# Patient Record
Sex: MALE | Employment: OTHER | URBAN - METROPOLITAN AREA
[De-identification: names, ages, dates, MRNs, and addresses within clinical notes are randomized per-mention and may not be internally consistent; named-entity substitution may affect disease eponyms.]

---

## 2023-01-01 ENCOUNTER — TELEPHONE (OUTPATIENT)
Dept: UROLOGY | Facility: CLINIC | Age: 63
End: 2023-01-01
Payer: MEDICARE

## 2023-01-01 ENCOUNTER — OFFICE VISIT (OUTPATIENT)
Dept: UROLOGY | Facility: CLINIC | Age: 63
End: 2023-01-01
Payer: MEDICARE

## 2023-01-01 VITALS
BODY MASS INDEX: 21.48 KG/M2 | DIASTOLIC BLOOD PRESSURE: 64 MMHG | WEIGHT: 145 LBS | HEIGHT: 69 IN | SYSTOLIC BLOOD PRESSURE: 84 MMHG | HEART RATE: 125 BPM

## 2023-01-01 DIAGNOSIS — R33.9 URINARY RETENTION: Primary | ICD-10-CM

## 2023-01-01 DIAGNOSIS — Z97.8 FOLEY CATHETER IN PLACE PRIOR TO ARRIVAL: ICD-10-CM

## 2023-01-01 PROCEDURE — 1159F MED LIST DOCD IN RCRD: CPT | Mod: CPTII,S$GLB,, | Performed by: NURSE PRACTITIONER

## 2023-01-01 PROCEDURE — 3074F PR MOST RECENT SYSTOLIC BLOOD PRESSURE < 130 MM HG: ICD-10-PCS | Mod: CPTII,S$GLB,, | Performed by: NURSE PRACTITIONER

## 2023-01-01 PROCEDURE — 99999 PR PBB SHADOW E&M-EST. PATIENT-LVL IV: CPT | Mod: PBBFAC,,, | Performed by: NURSE PRACTITIONER

## 2023-01-01 PROCEDURE — 1160F RVW MEDS BY RX/DR IN RCRD: CPT | Mod: CPTII,S$GLB,, | Performed by: NURSE PRACTITIONER

## 2023-01-01 PROCEDURE — 99204 OFFICE O/P NEW MOD 45 MIN: CPT | Mod: S$GLB,,, | Performed by: NURSE PRACTITIONER

## 2023-01-01 PROCEDURE — 1159F PR MEDICATION LIST DOCUMENTED IN MEDICAL RECORD: ICD-10-PCS | Mod: CPTII,S$GLB,, | Performed by: NURSE PRACTITIONER

## 2023-01-01 PROCEDURE — 99999 PR PBB SHADOW E&M-EST. PATIENT-LVL IV: ICD-10-PCS | Mod: PBBFAC,,, | Performed by: NURSE PRACTITIONER

## 2023-01-01 PROCEDURE — 3078F PR MOST RECENT DIASTOLIC BLOOD PRESSURE < 80 MM HG: ICD-10-PCS | Mod: CPTII,S$GLB,, | Performed by: NURSE PRACTITIONER

## 2023-01-01 PROCEDURE — 3078F DIAST BP <80 MM HG: CPT | Mod: CPTII,S$GLB,, | Performed by: NURSE PRACTITIONER

## 2023-01-01 PROCEDURE — 3008F PR BODY MASS INDEX (BMI) DOCUMENTED: ICD-10-PCS | Mod: CPTII,S$GLB,, | Performed by: NURSE PRACTITIONER

## 2023-01-01 PROCEDURE — 3074F SYST BP LT 130 MM HG: CPT | Mod: CPTII,S$GLB,, | Performed by: NURSE PRACTITIONER

## 2023-01-01 PROCEDURE — 1160F PR REVIEW ALL MEDS BY PRESCRIBER/CLIN PHARMACIST DOCUMENTED: ICD-10-PCS | Mod: CPTII,S$GLB,, | Performed by: NURSE PRACTITIONER

## 2023-01-01 PROCEDURE — 99204 PR OFFICE/OUTPT VISIT, NEW, LEVL IV, 45-59 MIN: ICD-10-PCS | Mod: S$GLB,,, | Performed by: NURSE PRACTITIONER

## 2023-01-01 PROCEDURE — 3008F BODY MASS INDEX DOCD: CPT | Mod: CPTII,S$GLB,, | Performed by: NURSE PRACTITIONER

## 2023-01-01 RX ORDER — LANCETS 30 GAUGE
EACH MISCELLANEOUS
COMMUNITY
Start: 2023-01-01

## 2023-01-01 RX ORDER — DEXAMETHASONE 2 MG/1
2 TABLET ORAL 2 TIMES DAILY
COMMUNITY
Start: 2023-01-01

## 2023-01-01 RX ORDER — GABAPENTIN 300 MG/1
300 CAPSULE ORAL 3 TIMES DAILY
COMMUNITY
Start: 2023-01-01

## 2023-01-01 RX ORDER — BLOOD-GLUCOSE METER
EACH MISCELLANEOUS 4 TIMES DAILY
COMMUNITY
Start: 2023-01-01

## 2023-01-01 RX ORDER — PHENAZOPYRIDINE HYDROCHLORIDE 200 MG/1
200 TABLET, FILM COATED ORAL 3 TIMES DAILY
COMMUNITY
Start: 2023-01-01 | End: 2023-01-01

## 2023-01-01 RX ORDER — LEVETIRACETAM 500 MG/1
500 TABLET ORAL EVERY 12 HOURS
COMMUNITY
Start: 2023-01-01

## 2023-01-01 RX ORDER — TRIAMCINOLONE ACETONIDE 1 MG/G
CREAM TOPICAL 2 TIMES DAILY
COMMUNITY
Start: 2023-01-01

## 2023-01-01 RX ORDER — TAMSULOSIN HYDROCHLORIDE 0.4 MG/1
1 CAPSULE ORAL
COMMUNITY
Start: 2023-01-01

## 2023-01-01 RX ORDER — MONTELUKAST SODIUM 10 MG/1
10 TABLET ORAL
COMMUNITY
Start: 2023-01-01

## 2023-01-01 RX ORDER — LANCETS 33 GAUGE
EACH MISCELLANEOUS
COMMUNITY
Start: 2023-01-01

## 2023-01-01 RX ORDER — DOCUSATE SODIUM 100 MG/1
100 CAPSULE, LIQUID FILLED ORAL DAILY PRN
COMMUNITY
Start: 2023-01-01

## 2023-01-01 RX ORDER — IPRATROPIUM BROMIDE AND ALBUTEROL SULFATE 2.5; .5 MG/3ML; MG/3ML
SOLUTION RESPIRATORY (INHALATION) EVERY 4 HOURS
COMMUNITY
Start: 2023-01-01

## 2023-01-01 RX ORDER — LEVOCETIRIZINE DIHYDROCHLORIDE 5 MG/1
5 TABLET, FILM COATED ORAL
COMMUNITY
Start: 2023-01-01

## 2023-01-01 RX ORDER — BUPROPION HYDROCHLORIDE 150 MG/1
150 TABLET, FILM COATED, EXTENDED RELEASE ORAL 2 TIMES DAILY
COMMUNITY
Start: 2023-01-01

## 2023-01-01 RX ORDER — LEVALBUTEROL TARTRATE 45 UG/1
2 AEROSOL, METERED ORAL EVERY 6 HOURS PRN
COMMUNITY
Start: 2023-01-01

## 2023-01-01 RX ORDER — PRAVASTATIN SODIUM 40 MG/1
TABLET ORAL
COMMUNITY
Start: 2023-01-01

## 2023-01-01 RX ORDER — OMEPRAZOLE 20 MG/1
20 CAPSULE, DELAYED RELEASE ORAL
COMMUNITY
Start: 2023-01-01

## 2023-01-01 RX ORDER — FLUTICASONE PROPIONATE 50 MCG
2 SPRAY, SUSPENSION (ML) NASAL
COMMUNITY
Start: 2023-01-01

## 2023-01-01 RX ORDER — CYCLOBENZAPRINE HCL 10 MG
10 TABLET ORAL
COMMUNITY
Start: 2023-01-01

## 2023-01-01 RX ORDER — ALBUTEROL SULFATE 90 UG/1
AEROSOL, METERED RESPIRATORY (INHALATION)
COMMUNITY
Start: 2023-01-01

## 2023-01-01 RX ORDER — OXYCODONE AND ACETAMINOPHEN 10; 325 MG/1; MG/1
1 TABLET ORAL DAILY PRN
COMMUNITY
Start: 2023-01-01

## 2023-01-01 RX ORDER — METOPROLOL TARTRATE 25 MG/1
25 TABLET, FILM COATED ORAL 2 TIMES DAILY
COMMUNITY
Start: 2023-01-01

## 2023-01-01 RX ORDER — HYOSCYAMINE SULFATE 0.12 MG/1
TABLET SUBLINGUAL
COMMUNITY
Start: 2023-01-01

## 2023-01-01 RX ORDER — ASPIRIN 81 MG/1
81 TABLET ORAL
COMMUNITY
Start: 2023-01-01

## 2023-01-01 RX ORDER — TIOTROPIUM BROMIDE INHALATION SPRAY 3.12 UG/1
SPRAY, METERED RESPIRATORY (INHALATION)
COMMUNITY
Start: 2023-01-01

## 2023-07-31 NOTE — TELEPHONE ENCOUNTER
----- Message from Rhiannon Mobley sent at 7/31/2023  3:41 PM CDT -----  Type: Needs Medical Advice  Who Called:  pt  Best Call Back Number: [phone]  Additional Information:  pt is calling the office to see if he can take a shower tonight with the catheter in. Please call back to advise. Thanks!

## 2023-08-01 NOTE — TELEPHONE ENCOUNTER
----- Message from Liliya [place] sent at 8/1/2023  8:09 AM CDT -----  Contact: Ernestina  Type:  Needs Medical Advice    Who Called: Partner Ernestina  \Would the patient rather a call back or a response via [facility]? call  Best Call Back Number: [phone] (home)     Additional Information: pt wants to know can he come at 2:30pm instead of 8:30am to get his catheter removed. Pt has no transportation as of right now. Please advise and thank you.

## 2023-08-08 NOTE — TELEPHONE ENCOUNTER
----- Message from Janis Navarro sent at 8/8/2023  3:17 PM CDT -----  Contact: self  Type: Needs Medical Advice  Who Called: patient   Best Call Back Number: [phone]  Additional Information: Plz call pt back when possible about a removal wants to come tomorrow between 1-2 pm if possible.Thanks

## 2023-08-08 NOTE — TELEPHONE ENCOUNTER
----- Message from Ignacia Leyva sent at 8/8/2023  4:00 PM CDT -----  Contact: Ernestina [phone]  Type:  Patient Returning Call    Who Called:  Pts significate other Ernestina   Who Left Message for Patient:  Kaylah   Does the patient know what this is regarding?:  Appt to remove cath   Best Call Back Number:  [phone]    Additional Information:  Pls call back and advise / Pt also has a question about mixing pain meds and muscle relaxer.

## 2023-08-11 NOTE — PROGRESS NOTES
[facility] Note  Staff: GRANT Saucedo    PCP: N/A    Chief Complaint: Hospital f/up-Urinary Retention    Subjective:        HPI: James Rosado is a 63 y.o. male NEW PT presents today to have shaffer catheter removed at this time.  He is accompanied by family member to ov today.    Pt states today that he recently injured his right pelvic side/hip area one month ago and was admitted to [facility]. (No records to review at this time)  During hospital stay, pt was unable to empty his bladder and  was discharged at that time with shaffer catheter due to inability to empty his bladder.  Therefore he is here today requesting it to be removed.    Today:  Upon arrival pt catheter intact and patent draining without difficulty at this time.    Current  med: Flomax 0.4 mg one capsule daily.  Pt denies any previous problems with urination prior to hospital visit.    REVIEW OF SYSTEMS:  A comprehensive 10 system review was performed and is negative except as noted above in HPI    PMHx:  Past Medical History:   Diagnosis Date    COPD (chronic obstructive pulmonary disease)     Emphysema, unspecified     Hypertension     Lung cancer      PSHx:  Past Surgical History:   Procedure Laterality Date    PELVIC FRACTURE SURGERY Right      Allergies:  Hydrocodone bitartrate    Medications: reviewed   Objective:     Vitals:    08/11/23 0841   BP: (!) 84/64   Pulse: (!) 125     General:WDWN in NAD  Eyes: PERRLA, normal conjunctiva  Respiratory: no increased work on breathing, clear to auscultation  Cardiovascular: regular rate and rhythm. No obvious extremity edema.  GI: palpation of masses. No tenderness. No hepatosplenomegaly to palpation.  Musculoskeletal: normal range of motion of bilateral upper extremities. Normal muscle strength and tone.  Skin: no obvious rashes or lesions. No tightening of skin noted.  Neurologic: CN grossly normal. Normal sensation.   Psychiatric: awake, alert  and oriented x 3. Mood and affect normal. Cooperative.    Catheter Removal Procedure Note:  10 cc of sterile water from catheter balloon was removed via syringe by me today.  Indwelling Catheter then removed by me with tip intact.    Pt tolerated procedure well with no problems.    Assessment:       1. Urinary retention    2. Chua catheter in place prior to arrival          Plan:   Urinary Retention:    Pt was encouraged to drink plenty of fluids throughout the day today.  As well as bladder training every 2 hrs today in order to begin urinating on his own.  Should he have problems with urination by this afternoon or evening, he needs to RTC or go to nearest ER for further evaluation.    Pt and wife states today they live far away and prefer to f/up at their local ER later today should he have a problem emptying his bladder.  They also stated they will f/up with local Urologist in their hometown in the future for further evaluation at this time.    [facility]: N/A    Petrona Eaton, LAINE-C

## 2023-08-29 NOTE — TELEPHONE ENCOUNTER
----- Message from Chaz Mills sent at 8/29/2023  1:38 PM CDT -----  Contact: Roxanne  Type:  Sooner Appointment Request    Caller is requesting a sooner appointment.  Caller declined first available appointment listed below.  Caller will not accept being placed on the waitlist and is requesting a message be sent to doctor.    Name of Caller:  Ernestina (BRICE)  When is the first available appointment?  N/a  Symptoms:  Catheter Removal  Best Call Back Number: [phone]  Additional Information:  SO called to schedule appt for Catheter removal pt needs 3 day notice for transportation  Thank you

## 2023-08-29 NOTE — TELEPHONE ENCOUNTER
SO called to schedule appt for Catheter removal pt needs 3 day notice for transportation    SO c/o not having transportation to [place] early in the morning for appt to get catheter removed. Explained pt must arrive at 830 to have catheter removed and rtc  in the p.m. for bladder scan to make sure pt is emptying bladder. Pt wants to come at 2p.m., explained to pt he would have to be in clinic for 830 a.m. pt refused, advised pt to go to nearest ER ( [facility]) , to have catheter removed. Pt  and SO voiced understanding

## 2023-09-01 NOTE — TELEPHONE ENCOUNTER
Spoke with patient's wife, informed of needing appt rescheduled to the morning, she stated she did not make the appt, it was the doctor's office. Appt rescheduled to next available morning, she accepted, she verbally understood.

## 2023-09-01 NOTE — TELEPHONE ENCOUNTER
This pt needs to be rescheduled to a morning visit before 9 am due to retention and shaffer catheter.  This pt and family were advised of this in the past.  Pt lives in [place] and needs to seek care more closer to home since transportation is a constant issue with him.  Thanks.